# Patient Record
Sex: MALE | Race: WHITE | NOT HISPANIC OR LATINO | Employment: OTHER | URBAN - METROPOLITAN AREA
[De-identification: names, ages, dates, MRNs, and addresses within clinical notes are randomized per-mention and may not be internally consistent; named-entity substitution may affect disease eponyms.]

---

## 2019-02-11 ENCOUNTER — OFFICE VISIT (OUTPATIENT)
Dept: GASTROENTEROLOGY | Facility: CLINIC | Age: 64
End: 2019-02-11
Payer: MEDICARE

## 2019-02-11 VITALS
HEIGHT: 75 IN | BODY MASS INDEX: 24.37 KG/M2 | SYSTOLIC BLOOD PRESSURE: 160 MMHG | WEIGHT: 196 LBS | HEART RATE: 78 BPM | DIASTOLIC BLOOD PRESSURE: 100 MMHG

## 2019-02-11 DIAGNOSIS — K21.9 GASTROESOPHAGEAL REFLUX DISEASE WITHOUT ESOPHAGITIS: ICD-10-CM

## 2019-02-11 DIAGNOSIS — R93.3 ABNORMAL CT SCAN, GASTROINTESTINAL TRACT: Primary | ICD-10-CM

## 2019-02-11 DIAGNOSIS — Z12.11 COLON CANCER SCREENING: ICD-10-CM

## 2019-02-11 PROCEDURE — 99214 OFFICE O/P EST MOD 30 MIN: CPT | Performed by: INTERNAL MEDICINE

## 2019-02-11 RX ORDER — LOSARTAN POTASSIUM 100 MG/1
100 TABLET ORAL DAILY
Refills: 4 | COMMUNITY
Start: 2019-01-23

## 2019-02-11 RX ORDER — LORAZEPAM 0.5 MG/1
0.5 TABLET ORAL DAILY PRN
Refills: 0 | COMMUNITY
Start: 2018-12-12

## 2019-02-11 RX ORDER — AMLODIPINE BESYLATE 5 MG/1
5 TABLET ORAL DAILY
Refills: 3 | COMMUNITY
Start: 2019-02-04

## 2019-02-11 NOTE — ASSESSMENT & PLAN NOTE
29-year-old male with reflux disease, recent EGD in 2018 negative for Hernandez's  Ultrasound and CT scan overall unremarkable  Patient has been doing well on the omeprazole once a day since the endoscopy and GERD lifestyle modifications discussed  He has tapered himself off the omeprazole and using ranitidine as needed

## 2019-02-11 NOTE — ASSESSMENT & PLAN NOTE
CT with small pancreatic calcification 1 2 cm  Repeat ultrasound on January 3, 2019 showed that the pancreas is unremarkable

## 2019-02-11 NOTE — LETTER
February 11, 2019     Natalie Armas, DO  [facility] [address]    Patient: Nolan Viramontes   YOB: 1955   Date of Visit: 2/11/2019       Dear Dr Jimena Peng: Thank you for referring Nolan Viramontes to me for evaluation  Below are my notes for this consultation  If you have questions, please do not hesitate to call me  I look forward to following your patient along with you  Sincerely,        Ilene Bucio MD        CC: No Recipients  Ilene Bucio MD  2/11/2019  3:16 PM  Sign at close encounter  [address] - Outpatient Follow-up Note  Nolan Viramontes 70 y o  male MRN: [id number]  Encounter: [id number]      ASSESSMENT AND PLAN:    Colon cancer screening  Normal colonoscopy January 31, 2011, recall 10 years  Abnormal CT scan, gastrointestinal tract  CT with small pancreatic calcification 1 2 cm  Repeat ultrasound on January 3, 2019 showed that the pancreas is unremarkable  Gastroesophageal reflux disease without esophagitis  64-year-old male with reflux disease, recent EGD in 2018 negative for Hernandez's  Ultrasound and CT scan overall unremarkable  Patient has been doing well on the omeprazole once a day since the endoscopy and GERD lifestyle modifications discussed  He has tapered himself off the omeprazole and using ranitidine as needed  Otherwise doing well  Followup Appointment[de-identified]  2 years  ______________________________________________________________________    Chief Complaint   Patient presents with    Follow-up     abdominal ultrasound       HPI:  64-year-old male here today for follow-up of his ultrasound  He had an abnormal CT showing some pancreatic calcifications last year so we repeated an ultrasound last month showing normal pancreas  Patient is otherwise without symptoms  Doing well  Denies any nausea vomiting abdominal pain or GI bleeding  Weight is stable      Historical Information   Past Medical History:   Diagnosis Date    GERD (gastroesophageal reflux disease)     Hypertension      Past Surgical History:   Procedure Laterality Date    INGUINAL HERNIA REPAIR      ORTHOPEDIC SURGERY      arm pins    VASECTOMY       Social History   Social History     Substance and Sexual Activity   Alcohol Use Never    Frequency: Never     Social History     Substance and Sexual Activity   Drug Use Never     Social History     Tobacco Use   Smoking Status Never Smoker   Smokeless Tobacco Never Used     Family History   Problem Relation Age of Onset    Colon cancer Neg Hx     Colon polyps Neg Hx        Meds/Allergies       Current Outpatient Medications:     amLODIPine (NORVASC) 5 mg tablet    LORazepam (ATIVAN) 0 5 mg tablet    losartan (COZAAR) 100 MG tablet    No Known Allergies    10 Point REVIEW OF SYSTEMS IS OTHERWISE NEGATIVE  Objective     Blood pressure 160/100, pulse 78, height 6' 3" (1 905 m), weight 88 9 kg (196 lb)  Body mass index is 24 5 kg/m²  PHYSICAL EXAM:    General Appearance:  Alert, cooperative, no distress  HEENT:  Normocephalic, atraumatic, anicteric  Neck: Supple, symmetrical, trachea midline  Lungs: Clear to auscultation bilaterally; no rales, rhonchi or wheezing; respirations unlabored   Heart: Regular rate and rhythm; no murmur, rub, or gallop  Abdomen:   Soft, non-tender, non-distended; normal bowel sounds; no masses, no organomegaly   Rectal:  Deferred   Extremities:  No cyanosis, clubbing or edema   Skin:  No jaundice, rashes, or lesions   Lymph nodes: No palpable cervical lymphadenopathy     Lab Results:   No results found for: WBC, HGB, HCT, MCV, PLT  No results found for: NA, K, CL, CO2, ANIONGAP, BUN, CREATININE, GLUCOSE, GLUF, CALCIUM, CORRECTEDCA, AST, ALT, ALKPHOS, PROT, BILITOT, EGFR  No results found for: IRON, TIBC, FERRITIN  No results found for: LIPASE      Radiology Results:   Reviewed ultrasound results from January 2019

## 2019-02-11 NOTE — PROGRESS NOTES
[facility] - Outpatient Follow-up Note  Remberto Gallegos 70 y o  male MRN: [id number]  Encounter: [id number]      ASSESSMENT AND PLAN:    Colon cancer screening  Normal colonoscopy January 31, 2011, recall 10 years  Abnormal CT scan, gastrointestinal tract  CT with small pancreatic calcification 1 2 cm  Repeat ultrasound on January 3, 2019 showed that the pancreas is unremarkable  Gastroesophageal reflux disease without esophagitis  61-year-old male with reflux disease, recent EGD in 2018 negative for Hernandez's  Ultrasound and CT scan overall unremarkable  Patient has been doing well on the omeprazole once a day since the endoscopy and GERD lifestyle modifications discussed  He has tapered himself off the omeprazole and using ranitidine as needed  Otherwise doing well  Followup Appointment[de-identified]  2 years  ______________________________________________________________________    Chief Complaint   Patient presents with    Follow-up     abdominal ultrasound       HPI:  61-year-old male here today for follow-up of his ultrasound  He had an abnormal CT showing some pancreatic calcifications last year so we repeated an ultrasound last month showing normal pancreas  Patient is otherwise without symptoms  Doing well  Denies any nausea vomiting abdominal pain or GI bleeding  Weight is stable      Historical Information   Past Medical History:   Diagnosis Date    GERD (gastroesophageal reflux disease)     Hypertension      Past Surgical History:   Procedure Laterality Date    INGUINAL HERNIA REPAIR      ORTHOPEDIC SURGERY      arm pins    VASECTOMY       Social History   Social History     Substance and Sexual Activity   Alcohol Use Never    Frequency: Never     Social History     Substance and Sexual Activity   Drug Use Never     Social History     Tobacco Use   Smoking Status Never Smoker   Smokeless Tobacco Never Used     Family History   Problem Relation Age of Onset    Colon cancer Neg Hx     Colon polyps Neg Hx        Meds/Allergies       Current Outpatient Medications:     amLODIPine (NORVASC) 5 mg tablet    LORazepam (ATIVAN) 0 5 mg tablet    losartan (COZAAR) 100 MG tablet    No Known Allergies    10 Point REVIEW OF SYSTEMS IS OTHERWISE NEGATIVE  Objective     Blood pressure 160/100, pulse 78, height 6' 3" (1 905 m), weight 88 9 kg (196 lb)  Body mass index is 24 5 kg/m²  PHYSICAL EXAM:    General Appearance:  Alert, cooperative, no distress  HEENT:  Normocephalic, atraumatic, anicteric  Neck: Supple, symmetrical, trachea midline  Lungs: Clear to auscultation bilaterally; no rales, rhonchi or wheezing; respirations unlabored   Heart: Regular rate and rhythm; no murmur, rub, or gallop  Abdomen:   Soft, non-tender, non-distended; normal bowel sounds; no masses, no organomegaly   Rectal:  Deferred   Extremities:  No cyanosis, clubbing or edema   Skin:  No jaundice, rashes, or lesions   Lymph nodes: No palpable cervical lymphadenopathy     Lab Results:   No results found for: WBC, HGB, HCT, MCV, PLT  No results found for: NA, K, CL, CO2, ANIONGAP, BUN, CREATININE, GLUCOSE, GLUF, CALCIUM, CORRECTEDCA, AST, ALT, ALKPHOS, PROT, BILITOT, EGFR  No results found for: IRON, TIBC, FERRITIN  No results found for: LIPASE      Radiology Results:   Reviewed ultrasound results from January 2019

## 2019-02-11 NOTE — PATIENT INSTRUCTIONS
Gastroesophageal Reflux Disease   AMBULATORY CARE:   Gastroesophageal reflux  reflux occurs when acid and food in the stomach back up into the esophagus  Gastroesophageal reflux disease (GERD) is reflux that occurs more than twice a week for a few weeks  It usually causes heartburn and other symptoms  GERD can cause other health problems over time if it is not treated  Common symptoms include:  Heartburn is the most common symptom of GERD  You may feel burning pain in your chest or below the breast bone  This usually occurs after meals and spreads to your neck, jaw, or shoulder  The pain gets better when you change positions  You may also have any of the following:  · Bitter or acid taste in your mouth    · Dry cough    · Trouble swallowing or pain with swallowing    · Hoarseness or sore throat    · Frequent burping or hiccups    · Feeling of fullness soon after you start eating  Seek care immediately if:  · You feel full and cannot burp or vomit  · You have severe chest pain and sudden trouble breathing  · Your bowel movements are black, bloody, or tarry-looking  · Your vomit looks like coffee grounds or has blood in it  Contact your healthcare provider if:   · You vomit large amounts, or you vomit often  · You have trouble breathing after you vomit  · You have trouble swallowing, or pain with swallowing  · You are losing weight without trying  · Your symptoms get worse or do not improve with treatment  · You have questions or concerns about your condition or care  Treatment for GERD:  Your healthcare provider may prescribe medicine to decrease stomach acid  He may also prescribe medicine that help your esophagus and stomach move food and liquid to your intestines  Surgery may be done if other treatments do not work  You may need surgery to wrap the upper part of the stomach around the esophageal sphincter  This will strengthen the sphincter and prevent reflux     Manage GERD: · Do not have foods or drinks that may increase heartburn  These include chocolate, peppermint, fried or fatty foods, drinks that contain caffeine, or carbonated drinks (soda)  Other foods include spicy foods, onions, tomatoes, and tomato-based foods  Do not have foods or drinks that can irritate your esophagus, such as citrus fruits, juices, and alcohol  · Do not eat large meals  When you eat a lot of food at one time, your stomach needs more acid to digest it  Eat 6 small meals each day instead of 3 large ones, and eat slowly  Do not eat meals 2 to 3 hours before bedtime  · Elevate the head of your bed  Place 6-inch blocks under the head of your bed frame  You may also use more than one pillow under your head and shoulders while you sleep  · Maintain a healthy weight  If you are overweight, weight loss may help relieve symptoms of GERD  · Do not smoke  Smoking weakens the lower esophageal sphincter and increases the risk of GERD  Ask your healthcare provider for information if you currently smoke and need help to quit  E-cigarettes or smokeless tobacco still contain nicotine  Talk to your healthcare provider before you use these products  · Do not wear clothing that is tight around your waist   Tight clothing can put pressure on your stomach and cause or worsen GERD symptoms  Follow up with your healthcare provider as directed:  Write down your questions so you remember to ask them during your visits  © 2017 [employer] Information is for End User's use only and may not be sold, redistributed or otherwise used for commercial purposes  All illustrations and images included in CareNotes® are the copyrighted property of A [employer] Inc  or [employer]  The above information is an  only  It is not intended as medical advice for individual conditions or treatments   Talk to your doctor, nurse or pharmacist before following any medical regimen to see if it is safe and effective for you

## 2019-06-01 LAB — HBA1C MFR BLD HPLC: 5.6 %

## 2019-08-05 ENCOUNTER — OFFICE VISIT (OUTPATIENT)
Dept: GASTROENTEROLOGY | Facility: CLINIC | Age: 64
End: 2019-08-05
Payer: MEDICARE

## 2019-08-05 VITALS
DIASTOLIC BLOOD PRESSURE: 90 MMHG | SYSTOLIC BLOOD PRESSURE: 170 MMHG | HEART RATE: 76 BPM | WEIGHT: 189 LBS | BODY MASS INDEX: 23.5 KG/M2 | HEIGHT: 75 IN

## 2019-08-05 DIAGNOSIS — R93.3 ABNORMAL CT SCAN, GASTROINTESTINAL TRACT: ICD-10-CM

## 2019-08-05 DIAGNOSIS — R10.11 RIGHT UPPER QUADRANT ABDOMINAL PAIN: ICD-10-CM

## 2019-08-05 DIAGNOSIS — K21.9 GASTROESOPHAGEAL REFLUX DISEASE WITHOUT ESOPHAGITIS: ICD-10-CM

## 2019-08-05 DIAGNOSIS — Z12.11 COLON CANCER SCREENING: ICD-10-CM

## 2019-08-05 DIAGNOSIS — R10.11 RUQ ABDOMINAL PAIN: Primary | ICD-10-CM

## 2019-08-05 PROCEDURE — 99214 OFFICE O/P EST MOD 30 MIN: CPT | Performed by: INTERNAL MEDICINE

## 2019-08-05 NOTE — PROGRESS NOTES
[facility] - Outpatient Follow-up Note  Favian Her 70 y o  male MRN: [id number]  Encounter: [id number]    ASSESSMENT AND PLAN:      1  RUQ abdominal pain  Recurrent, prior episodes in 2011 and 2017 improved without intervention  Prior CT showed a pancreatic calcification but follow-up ultrasound was unremarkable  No etiology on upper endoscopy  Remaining differential includes biliary dyskinesia or internal hernia  Will proceed with HIDA scan and if negative consider colonoscopy and small-bowel series    - NM hepatobiliary w rx; Future    2  Colon cancer screening  Negative colonoscopy 2011, 10 year recall    3  Abnormal CT scan, gastrointestinal tract  Pancreatic calcification, follow-up ultrasound negative  Normal LFTs and pancreatic enzymes in July      Followup Appointment:  Pending HIDA  ______________________________________________________________________    Chief Complaint   Patient presents with    Abdominal Pain     referred by Dr Henrietta Real     HPI:  The patient presents for recurrent abdominal pain  Over the past month he has had intense right upper quadrant pain that often radiates to his back  Symptoms are unpredictable and there are no food triggers  He had similar symptoms in 2011 and 2017  Workup over the years has included upper endoscopy, colonoscopy and CT without a definitive etiology  Symptoms are not positional and are not related to the bowel cycle  He denies nausea or vomiting      Historical Information   Past Medical History:   Diagnosis Date    GERD (gastroesophageal reflux disease)     Hypertension      Past Surgical History:   Procedure Laterality Date    INGUINAL HERNIA REPAIR      ORTHOPEDIC SURGERY      arm pins    VASECTOMY       Social History     Substance and Sexual Activity   Alcohol Use Never    Frequency: Never     Social History     Substance and Sexual Activity   Drug Use Never     Social History     Tobacco Use Smoking Status Never Smoker   Smokeless Tobacco Never Used     Family History   Problem Relation Age of Onset    Colon cancer Neg Hx     Colon polyps Neg Hx          Current Outpatient Medications:     amLODIPine (NORVASC) 5 mg tablet    LORazepam (ATIVAN) 0 5 mg tablet    losartan (COZAAR) 100 MG tablet  No Known Allergies    10 Point REVIEW OF SYSTEMS IS OTHERWISE NEGATIVE  PHYSICAL EXAM:    Blood pressure 170/90, pulse 76, height 6' 3" (1 905 m), weight 85 7 kg (189 lb)  Body mass index is 23 62 kg/m²  General Appearance:  Alert, cooperative, no distress  HEENT:  Normocephalic, atraumatic, anicteric  Neck: Supple, symmetrical, trachea midline  Lungs: Clear to auscultation bilaterally; no rales, rhonchi or wheezing; respirations unlabored   Heart: Regular rate and rhythm; no murmur, rub, or gallop  Abdomen:   Soft, non-tender, non-distended; normal bowel sounds; no masses, no organomegaly   Rectal:  Deferred   Extremities:  No cyanosis, clubbing or edema   Skin:  No jaundice, rashes, or lesions   Lymph nodes: No palpable cervical lymphadenopathy     Lab Results:   No results found for: WBC, HGB, HCT, MCV, PLT  No results found for: NA, K, CL, CO2, ANIONGAP, BUN, CREATININE, GLUCOSE, GLUF, CALCIUM, CORRECTEDCA, AST, ALT, ALKPHOS, PROT, BILITOT, EGFR  No results found for: IRON, TIBC, FERRITIN  No results found for: LIPASE    Radiology Results:   No results found

## 2019-08-20 ENCOUNTER — TELEPHONE (OUTPATIENT)
Dept: GASTROENTEROLOGY | Facility: CLINIC | Age: 64
End: 2019-08-20

## 2019-08-20 DIAGNOSIS — R10.11 RUQ ABDOMINAL PAIN: Primary | ICD-10-CM

## 2019-08-20 NOTE — TELEPHONE ENCOUNTER
Reviewed HIDA images  Not documented in report, but gallbladder is hyperdynamic (ejection fraction 85%)  Discussed with patient  CCK did not mimic his symptoms  Will proceed with small bowel series as we discussed at office visit  Order placed in epic  Patient wants x-ray at [facility]     If negative, we had discussed colonoscopy      If results come back while I am away, okay to  discuss with another physician

## 2019-08-20 NOTE — TELEPHONE ENCOUNTER
Pt left  mssg stating he sees Dr Sebastian Lagunas and had test for gallbladder and everything was negative  He still has pain; asks for [phone] w/ next steps

## 2019-08-30 ENCOUNTER — TELEPHONE (OUTPATIENT)
Dept: GASTROENTEROLOGY | Facility: CLINIC | Age: 64
End: 2019-08-30

## 2019-08-30 NOTE — TELEPHONE ENCOUNTER
----- Message from Khadra Donis DO sent at 8/29/2019  9:24 PM EDT -----  Eva Alaniz - the small bowel looks normal There is stool throughout the colon, that may play a role in your pain  I'll have the staff call you about colonoscopy, like we discussed  If the colonoscopy is normal, it may be worth seeing urology about the kidney cyst as seen on ultrasound, and the possible small kidney stone on this xray

## 2019-09-03 ENCOUNTER — CLINICAL SUPPORT (OUTPATIENT)
Dept: GASTROENTEROLOGY | Facility: CLINIC | Age: 64
End: 2019-09-03

## 2019-09-03 VITALS — HEIGHT: 75 IN | BODY MASS INDEX: 23.62 KG/M2 | WEIGHT: 190 LBS

## 2019-09-03 DIAGNOSIS — Z12.11 SCREENING FOR COLON CANCER: Primary | ICD-10-CM

## 2019-09-03 NOTE — PROGRESS NOTES
Pt seen for colon prep dx screening med's reviewed instructions given for suprep Rx sent to pharmacy